# Patient Record
Sex: FEMALE | Race: BLACK OR AFRICAN AMERICAN | NOT HISPANIC OR LATINO | Employment: UNEMPLOYED | URBAN - METROPOLITAN AREA
[De-identification: names, ages, dates, MRNs, and addresses within clinical notes are randomized per-mention and may not be internally consistent; named-entity substitution may affect disease eponyms.]

---

## 2024-11-19 ENCOUNTER — OFFICE VISIT (OUTPATIENT)
Dept: MATERNAL FETAL MEDICINE | Facility: CLINIC | Age: 34
End: 2024-11-19
Payer: MEDICAID

## 2024-11-19 ENCOUNTER — PROCEDURE VISIT (OUTPATIENT)
Dept: MATERNAL FETAL MEDICINE | Facility: CLINIC | Age: 34
End: 2024-11-19
Payer: MEDICAID

## 2024-11-19 VITALS
HEART RATE: 89 BPM | DIASTOLIC BLOOD PRESSURE: 76 MMHG | WEIGHT: 246.94 LBS | BODY MASS INDEX: 37.43 KG/M2 | HEIGHT: 68 IN | SYSTOLIC BLOOD PRESSURE: 127 MMHG

## 2024-11-19 DIAGNOSIS — O44.43 LOW LYING PLACENTA NOS OR WITHOUT HEMORRHAGE, THIRD TRIMESTER: ICD-10-CM

## 2024-11-19 DIAGNOSIS — Z36.89 ENCOUNTER FOR ROUTINE FETAL ULTRASOUND: Primary | ICD-10-CM

## 2024-11-19 DIAGNOSIS — Z36.89 ENCOUNTER FOR FETAL ANATOMIC SURVEY: ICD-10-CM

## 2024-11-19 PROCEDURE — 76811 OB US DETAILED SNGL FETUS: CPT | Mod: PBBFAC,PN | Performed by: STUDENT IN AN ORGANIZED HEALTH CARE EDUCATION/TRAINING PROGRAM

## 2024-11-19 PROCEDURE — 99212 OFFICE O/P EST SF 10 MIN: CPT | Mod: PBBFAC,TH,PN | Performed by: STUDENT IN AN ORGANIZED HEALTH CARE EDUCATION/TRAINING PROGRAM

## 2024-11-19 PROCEDURE — 99999 PR PBB SHADOW E&M-EST. PATIENT-LVL II: CPT | Mod: PBBFAC,,, | Performed by: STUDENT IN AN ORGANIZED HEALTH CARE EDUCATION/TRAINING PROGRAM

## 2024-11-19 NOTE — ASSESSMENT & PLAN NOTE
Adriano Nichols presents for consultation from Dr. Garay for low lying placenta. On our exam, no evidence of low lying placenta. Placenta is over 4 cm from internal cervical os. She has a history of 4 prior  sections. Given gestational age of 36 weeks, our images are limited however no uterine window is appreciated. Placenta is posterior.    No further follow up indicated and no indication for bedrest.

## 2024-11-19 NOTE — PROGRESS NOTES
"MATERNAL-FETAL MEDICINE   CONSULT NOTE    Provider requesting consultation: Dr. Garya  SUBJECTIVE:   Ms. Adriano Nichols is a 33 y.o.  female with IUP at 36w0d who is seen in consultation by MFM for evaluation and management of:  Problem   Encounter for Routine Fetal Ultrasound        Medication List with Changes/Refills   Current Medications    PRENATAL /IRON/FOLIC ACID (PRENATAL 19 ORAL)    Take by mouth.     Review of patient's allergies indicates:  No Known Allergies  OB History    Para Term  AB Living   8 4 4   3 4   SAB IAB Ectopic Multiple Live Births   3       4      # Outcome Date GA Lbr Nitin/2nd Weight Sex Type Anes PTL Lv   8 Current            7 2021           6 Term     M CS-LTranv   ELROY   5 2014           4 Term     F CS-LTranv   ELROY   3 2010           2 Term     M CS-LTranv   ELROY   1 Term     M CS-LTranv   ELROY     No past medical history on file.  Past Surgical History:   Procedure Laterality Date    AUGMENTATION OF BREAST       SECTION      SLEEVE GASTROPLASTY       Family history: negative for birth defects, recurrent miscarriages, chromosomal abnormalities.   Social History     Tobacco Use    Smoking status: Never    Smokeless tobacco: Never     Review of patient's allergies indicates:  No Known Allergies  Objective:   /76   Pulse 89   Ht 5' 8" (1.727 m)   Wt 112 kg (246 lb 14.6 oz)   BMI 37.54 kg/m²   Ultrasound performed. See viewpoint for full ultrasound report.  A detailed fetal anatomic ultrasound examination was performed for the following high risk indication: maternal BMI  No fetal structural malformations are identified; however, fetal imaging is incomplete today. No further scans scheduled due to late gestational age.  Fetal size today is consistent with established gestational age.   Placental location is posterior without evidence of previa.   No evidence of uterine window    ASSESSMENT/PLAN:     33 y.o.  " female with IUP at 36w0d     Encounter for routine fetal ultrasound  Adriano Nichols presents for consultation from Dr. Garay for low lying placenta. On our exam, no evidence of low lying placenta. Placenta is over 4 cm from internal cervical os. She has a history of 4 prior  sections. Given gestational age of 36 weeks, our images are limited however no uterine window is appreciated. Placenta is posterior.    No further follow up indicated and no indication for bedrest.      FOLLOW UP:   No further ultrasounds or visits were scheduled    Jennie Jaimes  Maternal-Fetal Medicine    Electronically Signed by Jennie Jaimes 2024